# Patient Record
Sex: MALE | Race: WHITE | Employment: FULL TIME | URBAN - NONMETROPOLITAN AREA
[De-identification: names, ages, dates, MRNs, and addresses within clinical notes are randomized per-mention and may not be internally consistent; named-entity substitution may affect disease eponyms.]

---

## 2019-11-08 ENCOUNTER — HOSPITAL ENCOUNTER (EMERGENCY)
Age: 54
Discharge: HOME | End: 2019-11-08
Attending: EMERGENCY MEDICINE
Payer: COMMERCIAL

## 2019-11-08 ENCOUNTER — APPOINTMENT (OUTPATIENT)
Dept: GENERAL RADIOLOGY | Age: 54
End: 2019-11-08
Payer: COMMERCIAL

## 2019-11-08 VITALS
OXYGEN SATURATION: 98 % | DIASTOLIC BLOOD PRESSURE: 91 MMHG | RESPIRATION RATE: 18 BRPM | SYSTOLIC BLOOD PRESSURE: 144 MMHG | TEMPERATURE: 98.1 F | HEART RATE: 83 BPM

## 2019-11-08 DIAGNOSIS — R10.13 DYSPEPSIA: ICD-10-CM

## 2019-11-08 DIAGNOSIS — R07.89 ATYPICAL CHEST PAIN: Primary | ICD-10-CM

## 2019-11-08 LAB
ALBUMIN SERPL-MCNC: 4.5 G/DL (ref 3.5–5.2)
ALP BLD-CCNC: 79 U/L (ref 40–130)
ALT SERPL-CCNC: 22 U/L (ref 5–41)
ANION GAP SERPL CALCULATED.3IONS-SCNC: 12 MMOL/L (ref 7–19)
AST SERPL-CCNC: 25 U/L (ref 5–40)
BASOPHILS ABSOLUTE: 0.1 K/UL (ref 0–0.2)
BASOPHILS RELATIVE PERCENT: 0.7 % (ref 0–1)
BILIRUB SERPL-MCNC: 0.3 MG/DL (ref 0.2–1.2)
BUN BLDV-MCNC: 8 MG/DL (ref 6–20)
CALCIUM SERPL-MCNC: 10.2 MG/DL (ref 8.6–10)
CHLORIDE BLD-SCNC: 101 MMOL/L (ref 98–111)
CO2: 25 MMOL/L (ref 22–29)
CREAT SERPL-MCNC: 0.9 MG/DL (ref 0.5–1.2)
EOSINOPHILS ABSOLUTE: 0.1 K/UL (ref 0–0.6)
EOSINOPHILS RELATIVE PERCENT: 2.1 % (ref 0–5)
GFR NON-AFRICAN AMERICAN: >60
GLUCOSE BLD-MCNC: 89 MG/DL (ref 74–109)
HCT VFR BLD CALC: 46.8 % (ref 42–52)
HEMOGLOBIN: 15.7 G/DL (ref 14–18)
IMMATURE GRANULOCYTES #: 0 K/UL
INR BLD: 2.41 (ref 0.88–1.18)
LIPASE: 57 U/L (ref 13–60)
LYMPHOCYTES ABSOLUTE: 2 K/UL (ref 1.1–4.5)
LYMPHOCYTES RELATIVE PERCENT: 29.3 % (ref 20–40)
MCH RBC QN AUTO: 31.5 PG (ref 27–31)
MCHC RBC AUTO-ENTMCNC: 33.5 G/DL (ref 33–37)
MCV RBC AUTO: 93.8 FL (ref 80–94)
MONOCYTES ABSOLUTE: 0.6 K/UL (ref 0–0.9)
MONOCYTES RELATIVE PERCENT: 8.5 % (ref 0–10)
NEUTROPHILS ABSOLUTE: 4 K/UL (ref 1.5–7.5)
NEUTROPHILS RELATIVE PERCENT: 59.3 % (ref 50–65)
PDW BLD-RTO: 12.8 % (ref 11.5–14.5)
PLATELET # BLD: 220 K/UL (ref 130–400)
PMV BLD AUTO: 10.2 FL (ref 9.4–12.4)
POTASSIUM REFLEX MAGNESIUM: 4.1 MMOL/L (ref 3.5–5)
PROTHROMBIN TIME: 25.5 SEC (ref 12–14.6)
RBC # BLD: 4.99 M/UL (ref 4.7–6.1)
SODIUM BLD-SCNC: 138 MMOL/L (ref 136–145)
TOTAL PROTEIN: 7.9 G/DL (ref 6.6–8.7)
TROPONIN: <0.01 NG/ML (ref 0–0.03)
WBC # BLD: 6.7 K/UL (ref 4.8–10.8)

## 2019-11-08 PROCEDURE — 83690 ASSAY OF LIPASE: CPT

## 2019-11-08 PROCEDURE — 85610 PROTHROMBIN TIME: CPT

## 2019-11-08 PROCEDURE — 99285 EMERGENCY DEPT VISIT HI MDM: CPT

## 2019-11-08 PROCEDURE — 6370000000 HC RX 637 (ALT 250 FOR IP): Performed by: EMERGENCY MEDICINE

## 2019-11-08 PROCEDURE — 36415 COLL VENOUS BLD VENIPUNCTURE: CPT

## 2019-11-08 PROCEDURE — 80053 COMPREHEN METABOLIC PANEL: CPT

## 2019-11-08 PROCEDURE — 85025 COMPLETE CBC W/AUTO DIFF WBC: CPT

## 2019-11-08 PROCEDURE — 71045 X-RAY EXAM CHEST 1 VIEW: CPT

## 2019-11-08 PROCEDURE — 84484 ASSAY OF TROPONIN QUANT: CPT

## 2019-11-08 PROCEDURE — 93005 ELECTROCARDIOGRAM TRACING: CPT

## 2019-11-08 RX ORDER — HYDROCODONE BITARTRATE AND ACETAMINOPHEN 7.5; 325 MG/1; MG/1
1 TABLET ORAL EVERY 6 HOURS PRN
Qty: 12 TABLET | Refills: 0 | Status: SHIPPED | OUTPATIENT
Start: 2019-11-08 | End: 2019-11-11

## 2019-11-08 RX ORDER — LORAZEPAM 0.5 MG/1
0.5 TABLET ORAL EVERY 6 HOURS PRN
COMMUNITY

## 2019-11-08 RX ORDER — LANSOPRAZOLE 30 MG/1
30 CAPSULE, DELAYED RELEASE ORAL DAILY
Qty: 30 CAPSULE | Refills: 0 | Status: SHIPPED | OUTPATIENT
Start: 2019-11-08

## 2019-11-08 RX ADMIN — LIDOCAINE HYDROCHLORIDE: 20 SOLUTION ORAL; TOPICAL at 13:48

## 2019-11-08 ASSESSMENT — PAIN SCALES - GENERAL: PAINLEVEL_OUTOF10: 5

## 2019-11-08 ASSESSMENT — PAIN DESCRIPTION - PAIN TYPE: TYPE: ACUTE PAIN

## 2019-11-08 ASSESSMENT — ENCOUNTER SYMPTOMS
VOMITING: 0
SHORTNESS OF BREATH: 0
NAUSEA: 0

## 2019-11-08 ASSESSMENT — PAIN DESCRIPTION - LOCATION: LOCATION: CHEST

## 2019-11-12 LAB
EKG P AXIS: 38 DEGREES
EKG P-R INTERVAL: 118 MS
EKG Q-T INTERVAL: 362 MS
EKG QRS DURATION: 86 MS
EKG QTC CALCULATION (BAZETT): 379 MS
EKG T AXIS: 42 DEGREES

## 2022-12-27 ENCOUNTER — HOSPITAL ENCOUNTER (OUTPATIENT)
Dept: MRI IMAGING | Age: 57
Discharge: HOME | End: 2022-12-27
Payer: OTHER GOVERNMENT

## 2022-12-27 DIAGNOSIS — M51.16 INTERVERTEBRAL DISC DISORDER WITH RADICULOPATHY OF LUMBAR REGION: ICD-10-CM

## 2022-12-27 PROCEDURE — 6360000004 HC RX CONTRAST MEDICATION: Performed by: FAMILY MEDICINE

## 2022-12-27 PROCEDURE — 72158 MRI LUMBAR SPINE W/O & W/DYE: CPT

## 2022-12-27 PROCEDURE — A9577 INJ MULTIHANCE: HCPCS | Performed by: FAMILY MEDICINE

## 2022-12-27 PROCEDURE — 72158 MRI LUMBAR SPINE W/O & W/DYE: CPT | Performed by: RADIOLOGY

## 2022-12-27 RX ADMIN — GADOBENATE DIMEGLUMINE 20 ML: 529 INJECTION, SOLUTION INTRAVENOUS at 10:38

## 2023-02-10 ENCOUNTER — HOSPITAL ENCOUNTER (EMERGENCY)
Age: 58
Discharge: HOME | End: 2023-02-10
Payer: OTHER GOVERNMENT

## 2023-02-10 ENCOUNTER — APPOINTMENT (OUTPATIENT)
Dept: CT IMAGING | Age: 58
End: 2023-02-10
Payer: OTHER GOVERNMENT

## 2023-02-10 VITALS
OXYGEN SATURATION: 97 % | SYSTOLIC BLOOD PRESSURE: 111 MMHG | TEMPERATURE: 98.1 F | HEART RATE: 65 BPM | HEIGHT: 75 IN | RESPIRATION RATE: 16 BRPM | DIASTOLIC BLOOD PRESSURE: 58 MMHG | BODY MASS INDEX: 30.84 KG/M2 | WEIGHT: 248 LBS

## 2023-02-10 DIAGNOSIS — M54.50 ACUTE EXACERBATION OF CHRONIC LOW BACK PAIN: Primary | ICD-10-CM

## 2023-02-10 DIAGNOSIS — G89.29 ACUTE EXACERBATION OF CHRONIC LOW BACK PAIN: Primary | ICD-10-CM

## 2023-02-10 LAB
BILIRUBIN URINE: NEGATIVE
BLOOD, URINE: NEGATIVE
CLARITY: CLEAR
COLOR: YELLOW
GLUCOSE URINE: NEGATIVE MG/DL
KETONES, URINE: NEGATIVE MG/DL
LEUKOCYTE ESTERASE, URINE: NEGATIVE
NITRITE, URINE: NEGATIVE
PH UA: 7 (ref 5–8)
PROTEIN UA: NEGATIVE MG/DL
SPECIFIC GRAVITY UA: 1.01 (ref 1–1.03)
UROBILINOGEN, URINE: 1 E.U./DL

## 2023-02-10 PROCEDURE — 72131 CT LUMBAR SPINE W/O DYE: CPT

## 2023-02-10 PROCEDURE — 6360000002 HC RX W HCPCS: Performed by: NURSE PRACTITIONER

## 2023-02-10 PROCEDURE — 81003 URINALYSIS AUTO W/O SCOPE: CPT

## 2023-02-10 PROCEDURE — 96372 THER/PROPH/DIAG INJ SC/IM: CPT

## 2023-02-10 PROCEDURE — 99284 EMERGENCY DEPT VISIT MOD MDM: CPT

## 2023-02-10 RX ORDER — LIDOCAINE 50 MG/G
1 PATCH TOPICAL EVERY 24 HOURS
Qty: 30 PATCH | Refills: 0 | Status: SHIPPED | OUTPATIENT
Start: 2023-02-10 | End: 2023-03-12

## 2023-02-10 RX ORDER — MORPHINE SULFATE 4 MG/ML
4 INJECTION, SOLUTION INTRAMUSCULAR; INTRAVENOUS ONCE
Status: COMPLETED | OUTPATIENT
Start: 2023-02-10 | End: 2023-02-10

## 2023-02-10 RX ORDER — HYDROCODONE BITARTRATE AND ACETAMINOPHEN 7.5; 325 MG/1; MG/1
1 TABLET ORAL EVERY 6 HOURS PRN
Qty: 10 TABLET | Refills: 0 | Status: SHIPPED | OUTPATIENT
Start: 2023-02-10 | End: 2023-02-13

## 2023-02-10 RX ORDER — PREDNISONE 50 MG/1
50 TABLET ORAL DAILY
Qty: 5 TABLET | Refills: 0 | Status: SHIPPED | OUTPATIENT
Start: 2023-02-10 | End: 2023-02-15

## 2023-02-10 RX ORDER — HYDROCODONE BITARTRATE AND ACETAMINOPHEN 5; 325 MG/1; MG/1
1 TABLET ORAL EVERY 6 HOURS PRN
Qty: 10 TABLET | Refills: 0 | Status: SHIPPED | OUTPATIENT
Start: 2023-02-10 | End: 2023-02-10

## 2023-02-10 RX ORDER — HYDROCODONE BITARTRATE AND ACETAMINOPHEN 7.5; 325 MG/1; MG/1
TABLET ORAL
COMMUNITY
Start: 2020-05-01 | End: 2023-02-10

## 2023-02-10 RX ADMIN — MORPHINE SULFATE 4 MG: 4 INJECTION, SOLUTION INTRAMUSCULAR; INTRAVENOUS at 13:54

## 2023-02-10 ASSESSMENT — PAIN SCALES - GENERAL
PAINLEVEL_OUTOF10: 7
PAINLEVEL_OUTOF10: 7

## 2023-02-10 ASSESSMENT — ENCOUNTER SYMPTOMS
ABDOMINAL PAIN: 0
VOMITING: 0
DIARRHEA: 0
COUGH: 0
NAUSEA: 0
BACK PAIN: 1
SHORTNESS OF BREATH: 0

## 2023-02-10 ASSESSMENT — PAIN - FUNCTIONAL ASSESSMENT: PAIN_FUNCTIONAL_ASSESSMENT: 0-10

## 2023-02-10 NOTE — ED PROVIDER NOTES
[facility] EMERGENCY DEPT  EMERGENCY DEPARTMENT ENCOUNTER      Pt Name: Alina Townsend  MRN: [id number]  [place] 1965  Date of evaluation: 2/10/2023  Provider: JOAQUÍN Maddox [id number]       Chief Complaint   Patient presents with    Back Pain     Lower back, unable to sit, reports he has 3 bulging disc and pain is becoming unbearable          HISTORY OF PRESENT ILLNESS   (Location/Symptom, Timing/Onset, Context/Setting, Quality, Duration, Modifying Factors, Severity)  Note limiting factors. Alina Townsend is a 62 y.o. male who presents to the emergency department with left sided low back pain. Reports initial injury to back was several months ago, has been in and out of ER, [facility] while awaiting for [facility] but [facility] is currently in [facility]. Reports he has been doing okay until last night when he moved wrong and pain is now just as bad when it started approx 4 months ago. He has no fever or chills. No numbness or tingling. Pain radiates from left low back toward left groin. No saddle anesthesia. No bowel or bladder dysfunction. Ambulatory to ER with cane, difficulty with sitting, so laying on right side in the bed on my arrival.     HPI    Nursing Notes were reviewed. REVIEW OF SYSTEMS    (2-9 systems for level 4, 10 or more for level 5)     Review of Systems   Constitutional:  Negative for chills and fever. HENT:  Negative for congestion. Respiratory:  Negative for cough and shortness of breath. Cardiovascular:  Negative for chest pain and palpitations. Gastrointestinal:  Negative for abdominal pain, diarrhea, nausea and vomiting. Genitourinary:  Negative for decreased urine volume, difficulty urinating, dysuria, flank pain, frequency and urgency. Musculoskeletal:  Positive for back pain. Negative for neck pain. Neurological:  Negative for dizziness, syncope, weakness, light-headedness and headaches.      Except as noted above the remainder of the review of systems was reviewed and negative. PAST MEDICAL HISTORY   History reviewed. No pertinent past medical history. SURGICAL HISTORY       Past Surgical History:   Procedure Laterality Date    CHOLECYSTECTOMY      CYST REMOVAL      pylo     KNEE SURGERY      LITHOTRIPSY           CURRENT MEDICATIONS       Discharge Medication List as of 2/10/2023  3:31 PM        CONTINUE these medications which have NOT CHANGED    Details   WARFARIN SODIUM PO Take 2.5 mg by mouth Indications: 5 mg MWFHistorical Med      LORazepam (ATIVAN) 0.5 MG tablet Take 0.5 mg by mouth every 6 hours as needed for Anxiety. Historical Med             ALLERGIES     Penicillins    FAMILY HISTORY     History reviewed. No pertinent family history. SOCIAL HISTORY       Social History     Socioeconomic History    Marital status:      Spouse name: None    Number of children: None    Years of education: None    Highest education level: None   Tobacco Use    Smoking status: Every Day     Packs/day: 0.50     Types: Cigarettes    Smokeless tobacco: Never   Substance and Sexual Activity    Alcohol use: Not Currently    Drug use: Not Currently       SCREENINGS         [place] Coma Scale  Eye Opening: Spontaneous  Best Verbal Response: Oriented  Best Motor Response: Obeys commands  [place] Coma Scale Score: 15                     CIWA Assessment  BP: (!) 111/58  Heart Rate: 65                 PHYSICAL EXAM    (up to 7 for level 4, 8 or more for level 5)     ED Triage Vitals [02/10/23 1304]   BP Temp Temp src Heart Rate Resp SpO2 Height Weight   (!) 153/94 97.6 °F (36.4 °C) -- (!) 105 18 96 % 6' 3\" (1.905 m) 248 lb (112.5 kg)       Physical Exam  Vitals reviewed. Constitutional:       General: He is not in acute distress. Appearance: Normal appearance. He is not ill-appearing, toxic-appearing or diaphoretic. HENT:      Head: Normocephalic and atraumatic.       Nose: Nose normal.   Cardiovascular: Rate and Rhythm: Normal rate and regular rhythm. Pulses: Normal pulses. Heart sounds: Normal heart sounds. Pulmonary:      Effort: Pulmonary effort is normal.      Breath sounds: Normal breath sounds. Abdominal:      General: Bowel sounds are normal. There is no distension. Palpations: Abdomen is soft. Tenderness: There is no abdominal tenderness. There is no right CVA tenderness, left CVA tenderness or guarding. Musculoskeletal:      Cervical back: Neck supple. Lumbar back: Tenderness present. No swelling, edema, deformity, signs of trauma, lacerations, spasms or bony tenderness. Decreased range of motion. Negative right straight leg raise test and negative left straight leg raise test. No scoliosis. Back:       Comments: Distal NVI   Skin:     General: Skin is warm and dry. Capillary Refill: Capillary refill takes less than 2 seconds. Neurological:      General: No focal deficit present. Mental Status: He is alert and oriented to person, place, and time. DIAGNOSTIC RESULTS     EKG: All EKG's are interpreted by the Emergency Department Physician who either signs or Co-signs this chart in the absence of a cardiologist.        RADIOLOGY:   Non-plain film images such as CT, Ultrasound and MRI are read by the radiologist. Plain radiographic images are visualized and preliminarily interpreted by the emergency physician with the below findings:        Interpretation per the Radiologist below, if available at the time of this note:    CT LUMBAR SPINE WO CONTRAST   Final Result   No acute fracture or subxluxation in the lumbar spine. Moderate multilevel degenerative changes. No CT evidence of high-grade central   canal narrowing but can be better assessed by MRI if clinically warranted.             ED BEDSIDE ULTRASOUND:   Performed by ED Physician - none    LABS:  Labs Reviewed   URINALYSIS WITH REFLEX TO CULTURE       All other labs were within normal range or not returned as of this dictation. EMERGENCY DEPARTMENT COURSE and DIFFERENTIAL DIAGNOSIS/MDM:   Vitals:    Vitals:    02/10/23 1304 02/10/23 1530   BP: (!) 153/94 (!) 111/58   Pulse: (!) 105 65   Resp: 18 16   Temp: 97.6 °F (36.4 °C) 98.1 °F (36.7 °C)   SpO2: 96% 97%   Weight: 248 lb (112.5 kg)    Height: 6' 3\" (1.905 m)            Medical Decision Making  Amount and/or Complexity of Data Reviewed  Labs: ordered. Decision-making details documented in ED Course. Radiology: ordered and independent interpretation performed. Decision-making details documented in ED Course. Risk  Prescription drug management. REASSESSMENT      No s/s for cauda equina. Counseled to treatment, follow up and return parameters. CRITICAL CARE TIME       CONSULTS:  None    PROCEDURES:  Unless otherwise noted below, none     Procedures         FINAL IMPRESSION      1. Acute exacerbation of chronic low back pain          DISPOSITION/PLAN   DISPOSITION Decision To Discharge 02/10/2023 03:10:47 PM      PATIENT REFERRED TO:  DO Roger Jeffers [facility]     Call in 3 days      DISCHARGE MEDICATIONS:  Discharge Medication List as of 2/10/2023  3:31 PM        START taking these medications    Details   HYDROcodone-acetaminophen (NORCO) 5-325 MG per tablet Take 1 tablet by mouth every 6 hours as needed for Pain for up to 3 days. Intended supply: 3 days. Take lowest dose possible to manage pain Max Daily Amount: 4 tablets, Disp-10 tablet, R-0Normal      predniSONE (DELTASONE) 50 MG tablet Take 1 tablet by mouth daily for 5 days, Disp-5 tablet, R-0Normal      lidocaine (LIDODERM) 5 % Place 1 patch onto the skin every 24 hours Place 1 patch onto the skin daily 12 hours on, 12 hours off., Disp-30 patch, R-0Normal           Controlled Substances Monitoring:     No flowsheet data found.     (Please note that portions of this note were completed with a voice recognition program.  Efforts were made to edit the dictations but occasionally words are mis-transcribed.)    JOAQUÍN Pierre CNP (electronically signed)  Attending Emergency Physician          JOAQUÍN Pierre CNP  02/10/23 1118 room air

## 2023-02-10 NOTE — ED NOTES
RN has explained Narcotic Policy to patient. Patient understands that they are not allowed to operate a motor vehicle for a minimum of 4 hours after administration. Patient is aware and understands that law enforcement will be contacted if the patient attempts to operate a motor vehicle prior to the stated 4 hours.         Bekah Bar RN  02/10/23 6621

## 2023-02-16 ENCOUNTER — TELEPHONE (OUTPATIENT)
Dept: NEUROSURGERY | Age: 58
End: 2023-02-16

## 2023-02-16 NOTE — TELEPHONE ENCOUNTER
[facility] New Patient Questionnaire    Diagnosis/Reason for Referral?    Acute exacerbation of chronic low back pain (ED Referral, states to  follow up in 3 days)    2. Who is completing questionnaire? Patient  Caregiver Family      3. Has the patient had any previous spinal/brain surgeries? YES April 2000      A. If yes, what is the name of the facility in which the surgery was performed? B. Procedure/Surgery performed? C. Who was the surgeon? D. When was the surgery? 21 YEARS AGO       E. Did the patient improve after the surgery? YES      4. Is this a second opinion? If yes, Dr. Luis Patel would like to review patient first before making the appointment. NO    5. Have MRI Images been obtain within the last year? Yes  No      XR  CT     If yes, where was the imaging performed? [facility]   If yes, what part of the body? Lumbar  Cervical  Thoracic  Brain     If yes, when was it obtained? 12/27/22    Note: if the scan was performed at a facility other than [facility], the disc will need to be brought to the appointment or we need to reach out to obtain the disc. A. Was the patient instructed to provide the disc? Yes   No      8. Has the patient had a NCV/EMG within the last year? Yes  No     If yes, where was it performed and date? MM/YY  Location:      9. Has the patient been to Physical Therapy? Yes  No     If yes, what location, how long attended, and last visit? Location: [facility]       Therapy Lasted:    Date of Last Visit: TWO WEEKS AGO      10. Has the patient been to Pain Management? Yes  No     If yes, what location and last visit     Location:   Last Visit:   Is it helping?

## 2023-02-23 ENCOUNTER — HOSPITAL ENCOUNTER (OUTPATIENT)
Dept: GENERAL RADIOLOGY | Age: 58
Discharge: HOME | End: 2023-02-23
Payer: OTHER GOVERNMENT

## 2023-02-23 ENCOUNTER — TELEPHONE (OUTPATIENT)
Dept: NEUROSURGERY | Age: 58
End: 2023-02-23

## 2023-02-23 ENCOUNTER — OFFICE VISIT (OUTPATIENT)
Dept: NEUROSURGERY | Age: 58
End: 2023-02-23
Payer: OTHER GOVERNMENT

## 2023-02-23 VITALS
BODY MASS INDEX: 30.84 KG/M2 | WEIGHT: 248 LBS | HEIGHT: 75 IN | HEART RATE: 114 BPM | SYSTOLIC BLOOD PRESSURE: 122 MMHG | OXYGEN SATURATION: 100 % | DIASTOLIC BLOOD PRESSURE: 79 MMHG

## 2023-02-23 DIAGNOSIS — G89.29 CHRONIC MIDLINE LOW BACK PAIN WITH BILATERAL SCIATICA: ICD-10-CM

## 2023-02-23 DIAGNOSIS — M54.41 CHRONIC MIDLINE LOW BACK PAIN WITH BILATERAL SCIATICA: ICD-10-CM

## 2023-02-23 DIAGNOSIS — Z98.890 HISTORY OF LUMBAR SURGERY: ICD-10-CM

## 2023-02-23 DIAGNOSIS — M54.42 CHRONIC MIDLINE LOW BACK PAIN WITH BILATERAL SCIATICA: ICD-10-CM

## 2023-02-23 DIAGNOSIS — M51.36 DDD (DEGENERATIVE DISC DISEASE), LUMBAR: Primary | ICD-10-CM

## 2023-02-23 DIAGNOSIS — M51.36 DDD (DEGENERATIVE DISC DISEASE), LUMBAR: ICD-10-CM

## 2023-02-23 PROCEDURE — 72120 X-RAY BEND ONLY L-S SPINE: CPT

## 2023-02-23 PROCEDURE — 72120 X-RAY BEND ONLY L-S SPINE: CPT | Performed by: RADIOLOGY

## 2023-02-23 PROCEDURE — 99204 OFFICE O/P NEW MOD 45 MIN: CPT | Performed by: NURSE PRACTITIONER

## 2023-02-23 RX ORDER — TRAMADOL HYDROCHLORIDE 50 MG/1
TABLET ORAL
COMMUNITY
Start: 2023-02-22

## 2023-02-23 RX ORDER — METHYLPREDNISOLONE 4 MG/1
2 TABLET ORAL DAILY
COMMUNITY

## 2023-02-23 ASSESSMENT — ENCOUNTER SYMPTOMS
GASTROINTESTINAL NEGATIVE: 1
EYES NEGATIVE: 1
BACK PAIN: 1
RESPIRATORY NEGATIVE: 1

## 2023-02-23 NOTE — PROGRESS NOTES
[facility] Neurosurgery  Office Visit      Chief Complaint   Patient presents with    New Patient     Pt seen in ED 2/10     Lower Back Pain     C/ o lower back pain with pain radiating down both hips and legs into knees. He states that in 10/22 hurt at work has gotten worse since then. he is currently on steroids for this pain which he states is helping. He has had to have 3 er visits since 10/22. Prior low back surgery 23 years ago. HISTORY OF PRESENT ILLNESS:    Jam Chu is a 62 y.o. male who presents with low back pain that has been ongoing for many years, worsened after a work accident in October 2022 and pain has been intermittent since then. Has been to the ED multiple   He mentions having a history of previous lumbar surgery 23 years ago per Dr. Lexie Escobar at [facility]. Prior to his previous surgery he complained of severe back pain and inability to walk. Following surgery he states he did great. The pain does radiate into the the bilateral hips, anterior thighs and to the knees \"the pain moves back and forth\". His pain is mostly located in the low back. Sitting for more than 10 minutes exacerbates his back pain. The patient denies numbness. He is currently on steroids that help with his pain and ability to walk. His pain is not changed when going from a seated to standing position. His pain is not changed with walking. His pain is not changed when lying flat. Overall, indicative that the patient does not have a mechanical nature to their pain. The patient has underwent a non-operative treatment course that has included:  NSAIDs (cannot take due to being on [facility])  Muscle Relaxers (does not like them)  Opiates (Norco)  Oral Steroids (currently taking)  Physical Therapy S[facility] [place] - was going good but last visit hurt him worse)  TENS Unit with PT: likes it       Of note he does use tobacco and does take blood thinning medications (warfarin). History reviewed.  No pertinent past medical history. Past Surgical History:   Procedure Laterality Date    CHOLECYSTECTOMY      CYST REMOVAL      pylo     KNEE SURGERY      LITHOTRIPSY      MICRODISCECTOMY LUMBAR      L3&L4       Current Outpatient Medications   Medication Sig Dispense Refill    traMADol (ULTRAM) 50 MG tablet       methylPREDNISolone (MEDROL) 4 MG tablet Take 2 mg by mouth daily      lidocaine (LIDODERM) 5 % Place 1 patch onto the skin every 24 hours Place 1 patch onto the skin daily 12 hours on, 12 hours off. 30 patch 0    WARFARIN SODIUM PO Take 2.5 mg by mouth Indications: 5 mg MWF      LORazepam (ATIVAN) 0.5 MG tablet Take 0.5 mg by mouth every 6 hours as needed for Anxiety. No current facility-administered medications for this visit. Allergies:  Penicillins    Social History:   Social History     Tobacco Use   Smoking Status Every Day    Packs/day: 0.50    Types: Cigarettes   Smokeless Tobacco Never     Social History     Substance and Sexual Activity   Alcohol Use Not Currently         Family History:   History reviewed. No pertinent family history. REVIEW OF SYSTEMS:  Constitutional: Negative. HENT: Negative. Eyes: Negative. Respiratory: Negative. Cardiovascular: Negative. Gastrointestinal: Negative. Genitourinary: Negative. Musculoskeletal:  Positive for back pain. Skin: Negative. Neurological:  Positive for tingling and weakness. Endo/Heme/Allergies: Negative. Psychiatric/Behavioral: Negative. PHYSICAL EXAM:  Vitals:    02/23/23 0908   BP: 122/79   Pulse: (!) 114   SpO2: 100%     Constitutional: appears well-developed and well-nourished.    Eyes - conjunctiva normal.  Pupils react to light  Ear, nose, throat - hearing intact to finger rub, No scars, masses, or lesions over external nose or ears, no atrophy oftongue  Neck- symmetric, no masses noted, no jugular vein distension  Respiration- chest wall appears symmetric, good expansion, normal effort without use of accessory muscles  Musculoskeletal - no significant wasting of muscles noted, no bony deformities, gait no gross ataxia  Extremities- no clubbing, cyanosis oredema  Skin - warm, dry, and intact. No rash, erythema, or pallor. Psychiatric - mood, affect, and behavior appear normal.     Neurologic Examination  Awake, Alert and oriented x 4  Normal speech pattern, following commands    Motor:  RIGHT:     iliopsoas 5/5    knee flexor 5/5    knee extension 5/5    EHL 5/5   dorsiflexion 5/5    plantar flexion 5/5    LEFT:       iliopsoas 5/5    knee flexor 5/5    knee extension 5/5    EHL 5/5   dorsiflexion 5/5    plantar flexion 5/5    No deficits to light touch or pinprick sensation  Reflexes are 2+ and symmetric  No myofacial tenderness to palpation  Antalgic Gait pattern    Lying down on exam table throughout the visit     DATA and IMAGING:    Nursing/pcp notes, imaging, labs, and vitals reviewed. PT,OT and/or speech notes reviewed    Lab Results   Component Value Date    WBC 6.7 11/08/2019    HGB 15.7 11/08/2019    HCT 46.8 11/08/2019    MCV 93.8 11/08/2019     11/08/2019     Lab Results   Component Value Date     11/08/2019    K 4.1 11/08/2019     11/08/2019    CO2 25 11/08/2019    BUN 8 11/08/2019    CREATININE 0.9 11/08/2019    GLUCOSE 89 11/08/2019    CALCIUM 10.2 (H) 11/08/2019    PROT 7.9 11/08/2019    LABALBU 4.5 11/08/2019    BILITOT 0.3 11/08/2019    ALKPHOS 79 11/08/2019    AST 25 11/08/2019    ALT 22 11/08/2019    LABGLOM >60 11/08/2019     Lab Results   Component Value Date    INR 2.41 (H) 11/08/2019    PROTIME 25.5 (H) 11/08/2019         Narrative   Exam: CT lumbar spine without contrast   History: Low back pain   Prior Exam: None. Technique: Contiguous axial CT images of the lumbar spine were obtained without   contrast.Coronal and sagital reformatted images were created. Contrast: None   Findings: The bone mineralization is normal.   There is no acute fracture.    No subluxation.   There are moderate multilevel degenerative changes in the lumbar spine.   Soft tissues are within normal limits.   Aortic atherosclerotic disease.       Impression   No acute fracture or subxluxation in the lumbar spine.   Moderate multilevel degenerative changes.No CT evidence of high-grade central   canal narrowing but can be better assessed by MRI if clinically warranted.   I have personally reviewed the images and my interpretation is:  Evidence of previous surgery at L2-3 on the right       Narrative   NO PRIOR REPORT AVAILABLE   Exam: MRI OF THE LUMBAR SPINE WITHOUT AND WITH CONTRAST   Clinical data: Intervertebral disc disorder with radiculopathy of lumbar region.   Technique: Multiplanar multisequence MRI of the lumbar spine without and with contrast. Axial imaging was performed through the L1 through S1 disc levels. Contrast used:Multihance. Amount: 20 cc.   Prior studies: No prior studies submitted.   Findings: For the purposes of this examination it was assumed that there are five non rib bearing lumbar type vertebrae with the inferior labeled L5. Straightening of the normal lordotic curvature from muscle spasm or positioning. Mild intervertebral    disc height loss at L2-L3. Dominant Schmorl's node superiorly at L3. Fatty endplate changes at this level. Normal lordotic curvature. No acute fracture or subluxation. Normal vertebral body and remainder of the intervertebral disc heights. Normal marrow    signal of the vertebrae. Conus medullaris in normal anatomic position. No abnormal extra-axial masses. Soft tissues are unremarkable.    Level by leveldisease is present as follows:   T12-L1: There is no abnormally positioned disc material. There is no significant spinal canal, lateral recess, neural foramina compromise, or nerve impingement.   L1-L2: There is no abnormally positioned disc material. There is no significant spinal canal, lateral recess, neural foramina compromise, or nerve  impingement. L2-L3: Mild circumferential disc bulge. Measures 1 mm. Bilateral foraminal components. L3-L4: Mild circumferential disc bulge measures 1-2 mm. Linear T2 hyperintensity within the disc posteriorly and in the right paramedian location which can be radial tear/fissure. L4-L5: Circumferential disc bulge measures 1-2 mm. L5-S1: Circumferential disc bulge measures 1-2 mm. Post contrast imaging demonstrates no abnormal cord, leptomeningeal or soft tissue enhancement. Impression   1. No acute abnormality. 2. Mild lumbar spondylosis with multilevel disc bulge. No nerve root impingement. No cord compression. 3. Retroperitoneal soft tissues are unremarkable. No abnormal enhancement. Recommendation:    Follow up as clinically indicated. Dictated and Electronically Signed by Prince Maroi GOMEZ, SA CERTIFIED at [id number] 12:54:28 PM    I have personally reviewed these images and my interpretation is:  DDD L2-3, L3-4  No significant neural element compression noted       ASSESSMENT:    Emily Smith is a 62 y.o. male with a history of previous lumbar surgery 23 years ago with current complaints of severe low back pain and intermittent BLE pain. ICD-10-CM    1. DDD (degenerative disc disease), lumbar  M51.36 XR LUMBAR SPINE FLEXION AND EXTENSION ONLY      2. Chronic midline low back pain with bilateral sciatica  M54.41 XR LUMBAR SPINE FLEXION AND EXTENSION ONLY    M54.42     G89.29       3. History of lumbar surgery  Z98.890 XR LUMBAR SPINE FLEXION AND EXTENSION ONLY          PLAN:  I have discussed and reviewed the results of the MRI lumbar spine with Mr. Gaxiola at length. We explained that he does have some DDD that can cause low back pain. There is no major neural element compression to explain the BLE pain.   We will move forward with additional films to rule out instability with flexion and extension films.    -Obtain XR lumbar flex/ex  -Follow up as needed unless x-rays show instability       Wili Holman, APRN

## 2023-02-23 NOTE — TELEPHONE ENCOUNTER
Naseem from [address] [place] [address] called to let us know that pt [facility] authorization  on 23.

## 2023-02-24 ENCOUNTER — TELEPHONE (OUTPATIENT)
Dept: NEUROSURGERY | Age: 58
End: 2023-02-24

## 2023-02-24 NOTE — TELEPHONE ENCOUNTER
Miriam Jordan left message stating that the [facility] is faxing over a new authorization for services in this office.  Please let him know once

## 2023-02-24 NOTE — TELEPHONE ENCOUNTER
Spoke with Laura Louise to let him know that his visit with [facility] for 2/23/2023 was not covered with [facility] at this time and that it will need to be filed with his regular insurance. Patient stated that he did have an authorization from [facility], but no authorization number. I stated that I spoke with Fernando Das at [place] and was informed that there was no authorization on file for him to be seen, in our office. I explained that if he had authorization for his ER visit that did not cover his follow up appointment with our office per Fernando Das at [place]. I explained that I sent a RFS form to [place] requesting authorization for his visit and that until we receive that his regular insurance would have to be billed. He stated that he didn't want his insurance billed, that he had to go to [place], he stated that he would call [facility] and call me back . He has my direct number [phone].

## 2023-06-02 ENCOUNTER — HOSPITAL ENCOUNTER (OUTPATIENT)
Dept: MRI IMAGING | Age: 58
Discharge: HOME | End: 2023-06-02
Payer: OTHER GOVERNMENT

## 2023-06-02 DIAGNOSIS — M25.551 PAIN IN RIGHT HIP: ICD-10-CM

## 2023-06-02 PROCEDURE — 73721 MRI JNT OF LWR EXTRE W/O DYE: CPT

## 2023-07-20 ENCOUNTER — TELEPHONE (OUTPATIENT)
Dept: NEUROSURGERY | Facility: CLINIC | Age: 58
End: 2023-07-20

## 2023-07-20 NOTE — TELEPHONE ENCOUNTER
Left a vm for patient to call me back at my direct extension regarding his referral to our office. I have some questions so if patient calls hub/office please transfer to me.

## 2023-08-22 ENCOUNTER — OFFICE VISIT (OUTPATIENT)
Dept: NEUROSURGERY | Facility: CLINIC | Age: 58
End: 2023-08-22
Payer: OTHER GOVERNMENT

## 2023-08-22 VITALS — WEIGHT: 254.8 LBS | BODY MASS INDEX: 31.03 KG/M2 | HEIGHT: 76 IN

## 2023-08-22 DIAGNOSIS — M79.604 BILATERAL LEG PAIN: ICD-10-CM

## 2023-08-22 DIAGNOSIS — R20.0 NUMBNESS AND TINGLING OF BOTH LEGS: ICD-10-CM

## 2023-08-22 DIAGNOSIS — E66.09 CLASS 1 OBESITY DUE TO EXCESS CALORIES WITH SERIOUS COMORBIDITY AND BODY MASS INDEX (BMI) OF 31.0 TO 31.9 IN ADULT: ICD-10-CM

## 2023-08-22 DIAGNOSIS — Z98.890 HISTORY OF EXCISION OF PILONIDAL CYST: ICD-10-CM

## 2023-08-22 DIAGNOSIS — R20.2 NUMBNESS AND TINGLING OF BOTH LEGS: ICD-10-CM

## 2023-08-22 DIAGNOSIS — M54.50 LUMBAR BACK PAIN: Primary | ICD-10-CM

## 2023-08-22 DIAGNOSIS — M79.605 BILATERAL LEG PAIN: ICD-10-CM

## 2023-08-22 PROCEDURE — 99204 OFFICE O/P NEW MOD 45 MIN: CPT | Performed by: NURSE PRACTITIONER

## 2023-08-22 NOTE — PATIENT INSTRUCTIONS
"DASH Eating Plan  DASH stands for Dietary Approaches to Stop Hypertension. The DASH eating plan is a healthy eating plan that has been shown to:  Reduce high blood pressure (hypertension).  Reduce your risk for type 2 diabetes, heart disease, and stroke.  Help with weight loss.  What are tips for following this plan?  Reading food labels  Check food labels for the amount of salt (sodium) per serving. Choose foods with less than 5 percent of the Daily Value of sodium. Generally, foods with less than 300 milligrams (mg) of sodium per serving fit into this eating plan.  To find whole grains, look for the word \"whole\" as the first word in the ingredient list.  Shopping  Buy products labeled as \"low-sodium\" or \"no salt added.\"  Buy fresh foods. Avoid canned foods and pre-made or frozen meals.  Cooking  Avoid adding salt when cooking. Use salt-free seasonings or herbs instead of table salt or sea salt. Check with your health care provider or pharmacist before using salt substitutes.  Do not more foods. Cook foods using healthy methods such as baking, boiling, grilling, roasting, and broiling instead.  Cook with heart-healthy oils, such as olive, canola, avocado, soybean, or sunflower oil.  Meal planning    Eat a balanced diet that includes:  4 or more servings of fruits and 4 or more servings of vegetables each day. Try to fill one-half of your plate with fruits and vegetables.  6-8 servings of whole grains each day.  Less than 6 oz (170 g) of lean meat, poultry, or fish each day. A 3-oz (85-g) serving of meat is about the same size as a deck of cards. One egg equals 1 oz (28 g).  2-3 servings of low-fat dairy each day. One serving is 1 cup (237 mL).  1 serving of nuts, seeds, or beans 5 times each week.  2-3 servings of heart-healthy fats. Healthy fats called omega-3 fatty acids are found in foods such as walnuts, flaxseeds, fortified milks, and eggs. These fats are also found in cold-water fish, such as sardines, salmon, " and mackerel.  Limit how much you eat of:  Canned or prepackaged foods.  Food that is high in trans fat, such as some fried foods.  Food that is high in saturated fat, such as fatty meat.  Desserts and other sweets, sugary drinks, and other foods with added sugar.  Full-fat dairy products.  Do not salt foods before eating.  Do not eat more than 4 egg yolks a week.  Try to eat at least 2 vegetarian meals a week.  Eat more home-cooked food and less restaurant, buffet, and fast food.  Lifestyle  When eating at a restaurant, ask that your food be prepared with less salt or no salt, if possible.  If you drink alcohol:  Limit how much you use to:  0-1 drink a day for women who are not pregnant.  0-2 drinks a day for men.  Be aware of how much alcohol is in your drink. In the [place] one drink equals one 12 oz bottle of beer (355 mL), one 5 oz glass of wine (148 mL), or one 1« oz glass of hard liquor (44 mL).  General information  Avoid eating more than 2,300 mg of salt a day. If you have hypertension, you may need to reduce your sodium intake to 1,500 mg a day.  Work with your health care provider to maintain a healthy body weight or to lose weight. Ask what an ideal weight is for you.  Get at least 30 minutes of exercise that causes your heart to beat faster (aerobic exercise) most days of the week. Activities may include walking, swimming, or biking.  Work with your health care provider or dietitian to adjust your eating plan to your individual calorie needs.  What foods should I eat?  Fruits  All fresh, dried, or frozen fruit. Canned fruit in natural juice (without added sugar).  Vegetables  Fresh or frozen vegetables (raw, steamed, roasted, or grilled). Low-sodium or reduced-sodium tomato and vegetable juice. Low-sodium or reduced-sodium tomato sauce and tomato paste. Low-sodium or reduced-sodium canned vegetables.  Grains  Whole-grain or whole-wheat bread. Whole-grain or whole-wheat pasta. Brown rice. Oatmeal. Quinoa.  Bulgur. Whole-grain and low-sodium cereals. Haven bread. Low-fat, low-sodium crackers. Whole-wheat flour tortillas.  Meats and other proteins  Skinless chicken or turkey. Ground chicken or turkey. Pork with fat trimmed off. Fish and seafood. Egg whites. Dried beans, peas, or lentils. Unsalted nuts, nut butters, and seeds. Unsalted canned beans. Lean cuts of beef with fat trimmed off. Low-sodium, lean precooked or cured meat, such as sausages or meat loaves.  Dairy  Low-fat (1%) or fat-free (skim) milk. Reduced-fat, low-fat, or fat-free cheeses. Nonfat, low-sodium ricotta or cottage cheese. Low-fat or nonfat yogurt. Low-fat, low-sodium cheese.  Fats and oils  Soft margarine without trans fats. Vegetable oil. Reduced-fat, low-fat, or light mayonnaise and salad dressings (reduced-sodium). Canola, safflower, olive, avocado, soybean, and sunflower oils. Avocado.  Seasonings and condiments  Herbs. Spices. Seasoning mixes without salt.  Other foods  Unsalted popcorn and pretzels. Fat-free sweets.  The items listed above may not be a complete list of foods and beverages you can eat. Contact a dietitian for more information.  What foods should I avoid?  Fruits  Canned fruit in a light or heavy syrup. Fried fruit. Fruit in cream or butter sauce.  Vegetables  Creamed or fried vegetables. Vegetables in a cheese sauce. Regular canned vegetables (not low-sodium or reduced-sodium). Regular canned tomato sauce and paste (not low-sodium or reduced-sodium). Regular tomato and vegetable juice (not low-sodium or reduced-sodium). Pickles. Olives.  Grains  Baked goods made with fat, such as croissants, muffins, or some breads. Dry pasta or rice meal packs.  Meats and other proteins  Fatty cuts of meat. Ribs. Fried meat. [place]. Bologna, salami, and other precooked or cured meats, such as sausages or meat loaves. Fat from the back of a pig (fatback). Bratwurst. Salted nuts and seeds. Canned beans with added salt. Canned or smoked fish.  Whole eggs or egg yolks. Chicken or turkey with skin.  Dairy  Whole or 2% milk, cream, and half-and-half. Whole or full-fat cream cheese. Whole-fat or sweetened yogurt. Full-fat cheese. Nondairy creamers. Whipped toppings. Processed cheese and cheese spreads.  Fats and oils  Butter. Stick margarine. Lard. Shortening. Ghee. [place] fat. Tropical oils, such as coconut, palm kernel, or palm oil.  Seasonings and condiments  Onion salt, garlic salt, seasoned salt, table salt, and sea salt. Worcestershire sauce. Tartar sauce. Barbecue sauce. Teriyaki sauce. Soy sauce, including reduced-sodium. Steak sauce. Canned and packaged gravies. Fish sauce. Oyster sauce. Cocktail sauce. Store-bought horseradish. Ketchup. Mustard. Meat flavorings and tenderizers. Bouillon cubes. Hot sauces. Pre-made or packaged marinades. Pre-made or packaged taco seasonings. Relishes. Regular salad dressings.  Other foods  Salted popcorn and pretzels.  The items listed above may not be a complete list of foods and beverages you should avoid. Contact a dietitian for more information.  Where to find more information  National Heart, Lung, [employer] [place]: www.nhlbi.nih.gov  American Heart Association: www.heart.org  Academy of Nutrition and Dietetics: www.eatright.org  National Kidney Foundation: www.kidney.org  Summary  The DASH eating plan is a healthy eating plan that has been shown to reduce high blood pressure (hypertension). It may also reduce your risk for type 2 diabetes, heart disease, and stroke.  When on the DASH eating plan, aim to eat more fresh fruits and vegetables, whole grains, lean proteins, low-fat dairy, and heart-healthy fats.  With the DASH eating plan, you should limit salt (sodium) intake to 2,300 mg a day. If you have hypertension, you may need to reduce your sodium intake to 1,500 mg a day.  Work with your health care provider or dietitian to adjust your eating plan to your individual calorie needs.  This information is not  intended to replace advice given to you by your health care provider. Make sure you discuss any questions you have with your health care provider.  Document Revised: 11/20/2020 Document Reviewed: 11/20/2020  [employer] Patient Education c 2023 [employer]  Tobacco Use Disorder  Tobacco use disorder (TUD) occurs when a person craves, seeks, and uses tobacco, regardless of the consequences. This disorder can cause problems with mental and physical health. It can affect your ability to have healthy relationships. It can also keep you from meeting your responsibilities at work, home, or school.  Tobacco products contain a dangerous chemical called nicotine. Nicotine triggers hormones that make the body feel stimulated and works on areas of the brain that make a person feel good. These effects can make the person depend on nicotine, which makes it hard to quit tobacco.  Tobacco may be:  Smoked as a cigarette or cigar.  Inhaled using vaping devices, such as e-cigarettes.  Smoked in a pipe or hookah.  Chewed as smokeless tobacco.  Inhaled into the nostrils as snuff.  What are the causes?  This condition is caused by using nicotine. Nicotine causes changes in your brain that make you want more and more. This is called addiction. This can make it hard to stop using tobacco once you start.  What are the signs or symptoms?  Symptoms of TUD may include:  Being unable to stop your tobacco use even after planned quit attempts.  Spending an abnormal amount of time getting or using tobacco.  Craving tobacco.  Tobacco use that:  Interferes with your work, school, or home life.  Interferes with your personal and social relationships.  Makes you give up activities that you once enjoyed or found important.  Using tobacco even though you know that it is:  Dangerous or bad for your health or someone else's health.  Causing problems in your life.  Needing more and more of the substance to get the same effect (developing tolerance).  Using  the substance to avoid unpleasant symptoms if you do not use the substance (withdrawal).  How is this diagnosed?  This condition may be diagnosed based on:  Your current and past tobacco use. Your health care provider may ask questions about how your tobacco use affects your life.  A physical exam.  You may be diagnosed with TUD if you have at least two symptoms within a 12-month period.  How is this treated?  This condition is treated by stopping tobacco use. Many people are unable to quit on their own and need help. Treatment may include:  Nicotine replacement therapy (NRT). NRT provides nicotine without the other harmful chemicals in tobacco. NRT gradually lowers the dosage of nicotine in the body and reduces withdrawal symptoms. NRT is available as:  Over-the-counter gums, lozenges, and skin patches.  Prescription mouth inhalers and nasal sprays.  Medicine that acts on the brain to reduce cravings and withdrawal symptoms.  A type of talk therapy that examines your triggers for tobacco use, how to avoid them, and how to cope with cravings (behavioral therapy).  Hypnosis. This may help with withdrawal symptoms.  Joining a support group for people coping with TUD.  The best treatment for TUD is usually a combination of medicine, talk therapy, and support groups. Recovery can be a long process. Many people start using tobacco again after stopping (relapse). If you relapse, it does not mean that treatment will not work.  Follow these instructions at home:    Lifestyle  Do not use any products that contain nicotine or tobacco. These products include cigarettes, chewing tobacco, and vaping devices, such as e-cigarettes. If you need help quitting, ask your health care provider.  Avoid things that trigger tobacco use as much as you can. Triggers include people and situations that usually cause you to use tobacco.  Avoid drinks that contain caffeine, including coffee. These may worsen some withdrawal symptoms.  Find ways  to manage stress. Wanting to smoke may cause stress, and stress can make you want to smoke. Relaxation techniques such as deep breathing, meditation, and yoga may help.  Attend support groups as needed. These groups are an important part of long-term recovery for many people.  General instructions  Take over-the-counter and prescription medicines only as told by your health care provider.  Check with your health care provider before taking any new prescription or over-the-counter medicines.  Decide on a friend, family member, or smoking quit-line (such as 1-800-QUIT-NOW in the [place] that you can call or text when you feel the urge to smoke or when you need help coping with cravings.  Keep all follow-up visits. This is important.  Contact a health care provider if:  You are not able to take your medicines as told by your health care provider.  Your symptoms get worse, even with treatment.  Summary  Tobacco use disorder (TUD) occurs when a person craves, seeks, and uses tobacco regardless of the consequences.  This condition may be diagnosed based on your current and past tobacco use and a physical exam.  Many people are unable to quit on their own and need help. Recovery can be a long process.  The most effective treatment for TUD is usually a combination of medicine, talk therapy, and support groups.  This information is not intended to replace advice given to you by your health care provider. Make sure you discuss any questions you have with your health care provider.  Document Revised: 12/13/2022 Document Reviewed: 12/13/2022  [employer] Patient Education c 2023 [employer]

## 2023-08-22 NOTE — PROGRESS NOTES
Primary Care Provider: Albino Lira MD  Requesting Provider: Citlali Farr A*    Chief Complaint:   Chief Complaint   Patient presents with    Back Pain     PT is here with complaints of back pain, bilateral hip pain and bilateral leg pain, numbness and tingling.        History of Present Illness  Consultation at the request of ZAC Hernandez.    HPI:  Rocky Chang is a 58 y.o. male who presents today with for evaluation of lumbar back pain.  History of 6 prior pilonidal cyst surgeries that began in [id number], as well as a L3-4 discectomy in 2000 at [facility] [place] for back pain that resolved postoperatively.  Mr. Chang has recently been evaluated at [facility], as well as by Dr. Roche at [facility].  No additional surgical  spine surgeries were recommended.    Mr. Chang has a 30+ year onset of lower lumbar/sacrum/coccyx pain primarily associated with his pilonidal cyst.  His lumbar back pain is minimal.  His overall discomfort has worsened over the past 5 years.  He additionally reports intermittent drainage from pilonidal cyst region, bilateral hip pain, as well as bilateral nondermatomal leg pain and dysesthesias.  His overall discomfort worsens with prolonged sitting, standing, and with physical activity.  Alleviating factors include lying down.  He denies lower extremity weakness, gait or balance abnormalities, or falls.  Mr. Chang denies fevers, chills, night sweats, unexplained weight loss, saddle anesthesia, or bowel or bladder dysfunction. He currently rates the severity of his symptoms 5/10.  No additional concerns at this time.      Mr. Chang has recently completed a dedicated course of physician directed physical therapy.  He does not participate in massage care or chiropractic care.  He was evaluated by pain management, however declined injections.     Oswestry Disability Index = 54%   Score   Pain Intensity Moderate pain-2   Personal Care Look after myself without pain-0   Lifting Only very  "light weights-4   Walking Pain prevents > 1/4 mile-2   Sitting Pain prevents sitting > 10 min-4   Standing Pain limits standing to < 10 min-4   Sleeping Can only sleep < 4 hrs-3   Sex Life (if applicable) Sex severely restricted by pain-4   Social Life I do not go out as often because of pain-3   Traveling Pain is bad but trips over 2 hrs-2   (Cherry et al, 1980)    SCORE INTERPRETATION OF THE OSWESTRY LBP DISABILITY QUESTIONNAIRE     40-60% Severe disability Pain remains the main problem in this group of patients, but travel, personal care, social life, sexual activity, and sleep are also affected.  These patients require detailed investigation.     ROS  Review of Systems   Constitutional:  Positive for activity change and fatigue.   HENT: Negative.     Eyes: Negative.    Respiratory: Negative.     Cardiovascular: Negative.    Gastrointestinal:  Positive for rectal pain.   Endocrine: Negative.    Genitourinary:  Positive for flank pain.   Musculoskeletal:  Positive for back pain, gait problem and joint swelling.   Skin: Negative.    Allergic/Immunologic: Negative.    Neurological:  Positive for dizziness, weakness and numbness.   Hematological: Negative.    Psychiatric/Behavioral: Negative.     All other systems reviewed and are negative.    Past Medical History:   Diagnosis Date    Low back pain        History reviewed. No pertinent surgical history.    Family History: family history is not on file.    Social History:  reports that he has been smoking cigarettes. He has never been exposed to tobacco smoke. He has never used smokeless tobacco. He reports current alcohol use. He reports that he does not use drugs.    Medications:    Current Outpatient Medications:     WARFARIN SODIUM PO, Take 2.5 mg by mouth., Disp: , Rfl:     Allergies:  Penicillins    Objective   Ht 193 cm (76\")   Wt 116 kg (254 lb 12.8 oz)   BMI 31.02 kg/mý   Physical Exam  Vitals and nursing note reviewed.   Constitutional:       General: " He is not in acute distress.     Appearance: Normal appearance. He is well-developed and well-groomed. He is obese. He is not ill-appearing, toxic-appearing or diaphoretic.          Comments: BMI 31.02   HENT:      Head: Normocephalic and atraumatic.      Right Ear: Hearing normal.      Left Ear: Hearing normal.   Eyes:      Extraocular Movements: EOM normal.      Conjunctiva/sclera: Conjunctivae normal.      Pupils: Pupils are equal, round, and reactive to light.   Neck:      Trachea: Trachea normal.   Cardiovascular:      Rate and Rhythm: Normal rate and regular rhythm.   Pulmonary:      Effort: Pulmonary effort is normal. No tachypnea, bradypnea, accessory muscle usage or respiratory distress.   Abdominal:      Palpations: Abdomen is soft.   Musculoskeletal:      Cervical back: Full passive range of motion without pain and neck supple.   Skin:     General: Skin is warm and dry.   Neurological:      Mental Status: He is alert and oriented to person, place, and time.      GCS: GCS eye subscore is 4. GCS verbal subscore is 5. GCS motor subscore is 6.      Deep Tendon Reflexes:      Reflex Scores:       Tricep reflexes are 1+ on the right side and 1+ on the left side.       Bicep reflexes are 1+ on the right side and 1+ on the left side.       Brachioradialis reflexes are 1+ on the right side and 1+ on the left side.       Patellar reflexes are 1+ on the right side and 1+ on the left side.       Achilles reflexes are 0 on the right side and 0 on the left side.  Psychiatric:         Speech: Speech normal.         Behavior: Behavior normal. Behavior is cooperative.     Neurologic Exam     Mental Status   Oriented to person, place, and time.   Attention: normal. Concentration: normal.   Speech: speech is normal   Level of consciousness: alert    Cranial Nerves     CN II   Visual fields full to confrontation.     CN III, IV, VI   Pupils are equal, round, and reactive to light.  Extraocular motions are normal.     CN V    Facial sensation intact.     CN VII   Facial expression full, symmetric.     CN VIII   CN VIII normal.     CN IX, X   CN IX normal.     CN XI   CN XI normal.     Motor Exam   Right arm tone: normal  Left arm tone: normal  Right leg tone: normal  Left leg tone: normal    Strength   Right deltoid: 5/5  Left deltoid: 5/5  Right biceps: 5/5  Left biceps: 5/5  Right triceps: 5/5  Left triceps: 5/5  Right wrist extension: 5/5  Left wrist extension: 5/5  Right iliopsoas: 5/5  Left iliopsoas: 5/5  Right quadriceps: 5/5  Left quadriceps: 5/5  Right anterior tibial: 5/5  Left anterior tibial: 5/5  Right gastroc: 5/5  Left gastroc: 5/5  Right EHL 5/5  Left EHL 5/5       Sensory Exam   Right arm light touch: normal  Left arm light touch: normal  Right leg light touch: normal  Left leg light touch: normal    Gait, Coordination, and Reflexes     Tremor   Resting tremor: absent  Intention tremor: absent  Action tremor: absent    Reflexes   Right brachioradialis: 1+  Left brachioradialis: 1+  Right biceps: 1+  Left biceps: 1+  Right triceps: 1+  Left triceps: 1+  Right patellar: 1+  Left patellar: 1+  Right achilles: 0  Left achilles: 0  Right plantar: normal  Left plantar: normal  Right Forte: absent  Left Forte: absent  Right ankle clonus: absent  Left ankle clonus: absent  Right pendular knee jerk: absent  Left pendular knee jerk: absent  Antalgic gait     Imaging: (independent review and interpretation)  12/27/2022.  MRI of the lumbar spine shows no evidence of acute fractures or malalignment, no bone marrow signal change, no STIR signal change, the conus terminates at a normal level, multilevel facet arthropathy and ligamentous flavum hypertrophy resulting in mild thecal sac compression and bilateral foraminal narrowing at L3-4, slightly worse at L4-5.      2/10/2023.  CT of the lumbar spine shows no evidence of acute fractures or malalignment.  Multilevel degenerative changes       ASSESSMENT and PLAN  Rocky mccabe a  58 y.o. male with a significant medical history of recurrent pilonidal cyst surgeries, L3-4 microdiscectomy (SLU), tobacco abuse, and obesity.  He presents with a new problem of minimal lumbar back pain, primarily sacral/coccyx pain, bilateral hip pain, and bilateral nondermatomal leg pain and dysesthesias. FLORY: 54.  Physical exam findings of no drainage or discharge noted from sacrum/coccyx region, gross hyporeflexia with absent bilateral Achilles reflexes, and an antalgic gait.  His imaging, MRI of the lumbar spine from 12/27/2022 shows mild thecal sac compression and bilateral foraminal narrowing at L3-4, slightly worse at L4-5.    RECOMMENDATIONS ...  Mechanical low back pain  Nondermatomal bilateral leg pain and dysesthesias    Mechanical Low Back Pain:    Defined back pain as worsened with sitting and standing and nearly relieved with rest.  This can include referred pain radiating down posterior thighs without descent below the knees.     DDX:  spondylolisthesis with mechanical instability  compression fracture  degenerative facet arthropathy  coronal or sagittal spinal malalignment  failed back syndrome after surgery  flat back syndrome.   MUCH LESS LIKELY  myofascial pain  drug induced myalgias (statins, Neulasta, or phosphodiesterase type V inhibitors such as tadalafil).  Infectious discitis, vertebral osteomyelitis, spinal epidural abscess,   Neoplastic: spinal tumor (intradural or extradural), multiple myeloma, sacroiliitis  Degenerative spine: lumbar stenosis   Spondyloarthropathies including ankylosis spondylitis ([id number]), Paget's disease.  Fibromyalgia or other rheumatological disease. Malingering, conversion disorder and secondary gain are diagnoses of exclusion.    Mr. Chang recently completed a dedicated course of physician directed physical therapy with minimal improvement in his discomfort.  Given the age of his last MRI, we will send him for a repeat noncontrasted MRI of the lumbar spine to rule  out worsening lumbar stenosis and need for surgical intervention.  I additionally recommended sending him for an EMG and nerve conduction study of the bilateral lower extremities, however Mr. Chang declined my recommendation.  I would like him to return for reassessment with Dr. Dumont after all studies been completed.  Mr. Chang may contact the neurosurgical clinic to return sooner for any new or additional concerns and agree with this plan of care.    Recurrent pilonidal cyst surgeries  Reports of recurrent drainage from coccyx/sacral region  My initial plan was to obtain a CT of the abdomen and pelvis with IV contrast to assess for an area of fluid collection, and for continuity of care refer him to general surgery for evaluation.   However, Mr. Chang's [facility] and they require specialty referrals to come directly from his [facility] physician.  Therefore, we will defer imaging and the referral to general surgery to the [facility].    Tobacco abuse  The patient understands the many dangers of continuing to use tobacco.  If quitting becomes an increased priority Rocky knows to contact us for help with quitting.       Class 1 obesity (BMI 30.0-34.9) due to excessive calories with serious comorbidities BMI of 31.0-31.9 in adult  Body mass index is 31.02 kg/mý. Information on the DASH diet provided in the AVS.  We will continue to provided diet and exercise information with the goal of weight loss at each scheduled appointment.     Diagnoses and all orders for this visit:    1. Lumbar back pain (Primary)  -     MRI Lumbar Spine Without Contrast; Future    2. Bilateral leg pain  -     MRI Lumbar Spine Without Contrast; Future    3. Numbness and tingling of both legs  -     MRI Lumbar Spine Without Contrast; Future    4. History of excision of pilonidal cyst  -     Cancel: Ambulatory Referral to General Surgery  -     Cancel: CT Abdomen Pelvis With Contrast    5. Class 1 obesity due to excess calories with serious  comorbidity and body mass index (BMI) of 31.0 to 31.9 in adult      Return for Follow-up with Dr[facility] Dumont at [facility].    Thank you for this Consultation and the opportunity to participate in Rocky's care.    Sincerely,  KATELIN Ogden    Medical Decision Making (2/3)  Problem Points (2,3,4 or more)  New Problem, additional workup = 4x1  Data Points (2,3,4 or more)  Ordered Imaging (X-ray,CT, MRI) = 1x1.  Independent Review of Imaging or specimen = 2x2  Risk (Low, Mod, High)  Undiagnosed New problem (Moderate)

## 2023-09-26 ENCOUNTER — HOSPITAL ENCOUNTER (OUTPATIENT)
Dept: MRI IMAGING | Facility: HOSPITAL | Age: 58
Discharge: HOME | End: 2023-09-26
Admitting: NURSE PRACTITIONER
Payer: OTHER GOVERNMENT

## 2023-09-26 DIAGNOSIS — M79.604 BILATERAL LEG PAIN: ICD-10-CM

## 2023-09-26 DIAGNOSIS — M54.50 LUMBAR BACK PAIN: ICD-10-CM

## 2023-09-26 DIAGNOSIS — M79.605 BILATERAL LEG PAIN: ICD-10-CM

## 2023-09-26 DIAGNOSIS — R20.2 NUMBNESS AND TINGLING OF BOTH LEGS: ICD-10-CM

## 2023-09-26 DIAGNOSIS — R20.0 NUMBNESS AND TINGLING OF BOTH LEGS: ICD-10-CM

## 2023-09-26 PROCEDURE — 72148 MRI LUMBAR SPINE W/O DYE: CPT

## 2023-10-02 NOTE — PROGRESS NOTES
I have reviewed the history of presenting illness and exam per Davis FERRERA as well as the imaging.  MRI of the lumbar spine shows postoperative changes from prior L3/4 discectomy.  No clinically significant spondylolisthesis.  No stenosis.  No tumors or masses.  No role for surgery in this patient's care.  Although given pilonidal cyst there is the potential for sacral osteomyelitis, but we do not operate on the sacrum or coccyx.  Follow up not necessary but happy to review imaging with the patient at his previously scheduled follow-up

## 2023-10-03 ENCOUNTER — TELEPHONE (OUTPATIENT)
Dept: NEUROSURGERY | Facility: CLINIC | Age: 58
End: 2023-10-03
Payer: OTHER GOVERNMENT

## 2023-10-03 NOTE — TELEPHONE ENCOUNTER
----- Message from KATELIN Ogden sent at 10/2/2023 11:12 AM CDT -----  Dr. Dumont has reviewed history, physical exam, and all imaging and/or exams.  No neurosurgical intervention is warranted at this time.  We recommend you continue conservative management with physical therapy exercises and if applicable nonsteroidal anti-inflammatories.  If not currently enrolled, we would be more than happy to place a referral to pain management for evaluation and consideration for treating your discomfort with oral medications and/or injections.  If desired, you may keep scheduled appointment with Dr. Dumont to review imaging and results, otherwise we will cancel your appointment.  Please feel free to call or return for any new or additional concerns.    Although given pilonidal cyst there is the potential for sacral osteomyelitis, but we do not operate on the sacrum or coccyx.    Thank you for allowing us to provide care,    KATELIN Ogden    ----- Message -----  From: Yrn Dumont MD  Sent: 10/2/2023  10:25 AM CDT  To: KATELIN Ogden    I have reviewed the history of presenting illness and exam per Davis FERRERA as well as the imaging.  MRI of the lumbar spine shows postoperative changes from prior L3/4 discectomy.  No clinically significant spondylolisthesis.  No stenosis.  No tu  mors or masses.  No role for surgery in this patient's care.  Although given pilonidal cyst there is the potential for sacral osteomyelitis, but we do not operate on the sacrum or coccyx.  Follow up not necessary but happy to review imaging with the   patient at his previously scheduled follow-up

## 2023-10-12 ENCOUNTER — TELEPHONE (OUTPATIENT)
Dept: NEUROSURGERY | Facility: CLINIC | Age: 58
End: 2023-10-12
Payer: OTHER GOVERNMENT

## 2023-10-12 NOTE — TELEPHONE ENCOUNTER
PT called in with questions re his results.    I have attempted to call him back and had to leave a msg.   I left Amada's ext since I will be out of the office  until Mon.    He had appt yesterday 10/11/2023 with Dr. Dumont and cancelled.

## 2024-02-27 ENCOUNTER — HOSPITAL ENCOUNTER (OUTPATIENT)
Dept: GENERAL RADIOLOGY | Facility: HOSPITAL | Age: 59
Discharge: HOME | End: 2024-02-27
Admitting: ANESTHESIOLOGY
Payer: OTHER GOVERNMENT

## 2024-02-27 ENCOUNTER — TRANSCRIBE ORDERS (OUTPATIENT)
Dept: ADMINISTRATIVE | Facility: HOSPITAL | Age: 59
End: 2024-02-27
Payer: OTHER GOVERNMENT

## 2024-02-27 DIAGNOSIS — M53.3 DISORDER OF SACRUM: Primary | ICD-10-CM

## 2024-02-27 DIAGNOSIS — M53.3 DISORDER OF SACRUM: ICD-10-CM

## 2024-02-27 PROCEDURE — 72220 X-RAY EXAM SACRUM TAILBONE: CPT

## 2024-04-17 ENCOUNTER — HOSPITAL ENCOUNTER (OUTPATIENT)
Dept: MRI IMAGING | Age: 59
Discharge: HOME | End: 2024-04-17
Payer: OTHER GOVERNMENT

## 2024-04-17 DIAGNOSIS — M54.50 LOW BACK PAIN POTENTIALLY ASSOCIATED WITH RADICULOPATHY: ICD-10-CM

## 2024-04-17 DIAGNOSIS — L05.91 PILONIDAL CYST: ICD-10-CM

## 2024-04-17 PROCEDURE — 72148 MRI LUMBAR SPINE W/O DYE: CPT
